# Patient Record
Sex: FEMALE | URBAN - METROPOLITAN AREA
[De-identification: names, ages, dates, MRNs, and addresses within clinical notes are randomized per-mention and may not be internally consistent; named-entity substitution may affect disease eponyms.]

---

## 2023-10-13 ENCOUNTER — TELEPHONE (OUTPATIENT)
Dept: PAIN MEDICINE | Facility: CLINIC | Age: 38
End: 2023-10-13
Payer: OTHER GOVERNMENT

## 2023-10-13 NOTE — TELEPHONE ENCOUNTER
Staff contacted pt regarding pt call back message and let her know that Dr. Giron not do Ketamine infusions or prescribe ketamine. Pt said that the Ketamine infusions was what she was mainly looking for and does not want to drive 4 hours. Pt asked staff to cancel her appointment. Pt asked if Dr. Giron knew anyone who gave Ketamine infusions. Staff said that staff would message Dr. Giron

## 2023-10-13 NOTE — TELEPHONE ENCOUNTER
----- Message from Stephenie Blas sent at 10/13/2023  2:57 PM CDT -----  Good Afternoon,     [facility] would like to refer the following patient to the Pain Management  department. The patients diagnosis is Other chronic pain. I have scanned the patients referral and records into [employer]. Patient states she has been up to 400mg of Ketamine and would like to make sure she can get this on her visit with Dr. Giron on 11/30,because she will be traveling 4 hours to the appointment other wise she would like to change it to a virtual visit. Please call     Thank you,    Stephenie   [facility]